# Patient Record
Sex: MALE | Race: WHITE | NOT HISPANIC OR LATINO | URBAN - METROPOLITAN AREA
[De-identification: names, ages, dates, MRNs, and addresses within clinical notes are randomized per-mention and may not be internally consistent; named-entity substitution may affect disease eponyms.]

---
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
## 2024-11-18 NOTE — ED ADULT TRIAGE NOTE - WHEN WAS YOUR LAST VACCINATION? MONTH
Chronology Of Present Illness: Patient has successfully completed 3 weeks bid application 5fu to the left shin. Follow up in 3 months Detail Level: Detailed September